# Patient Record
Sex: FEMALE | Race: WHITE
[De-identification: names, ages, dates, MRNs, and addresses within clinical notes are randomized per-mention and may not be internally consistent; named-entity substitution may affect disease eponyms.]

---

## 2017-10-30 NOTE — ED PHYSICIAN DOCUMENTATION
History of Present Illness





- Stated complaint


Stated Complaint: FAST HEART BEAT





- Chief complaint


Chief Complaint: Cardiac





- History obtained from


History obtained from: Patient, Family





- History of Present Illness


Timing: How many weeks ago (2)


Pain level max: 0


Pain level now: 0


Improved by: lying down


Worsened by: standing, walking





- Additonal information


Additional information: 





Patient is a 32-year-old female who presents to the emergency department with a 

complaint of her heart racing when she stands up or exerts herself over the 

past few weeks.  Has been seen by her primary care doctor for the same and had 

normal blood work done.  Including TSH and electrolytes.  She is feeling more 

tired than usual and was sent here for evaluation.  She believes that she may 

have been pregnant and may have had a miscarriage.  Is not currently vaginally 

bleeding.





Review of Systems


Ten Systems: 10 systems reviewed and negative


Constitutional: denies: Fever, Chills


Ears: denies: Ear pain


Nose: reports: Rhinorrhea / runny nose (now improved).  denies: Congestion


Throat: reports: Sore throat (mild, now better)


Cardiac: denies: Chest pain / pressure


Respiratory: denies: Cough, Wheezing


GI: denies: Abdominal Pain, Nausea, Vomiting, Diarrhea


: denies: Dysuria, Frequency, Hesitancy, Now pregnant EGA


Skin: denies: Rash


Musculoskeletal: denies: Neck pain, Back pain


Neurologic: reports: Headache (2/10 gradual onset)





PD PAST MEDICAL HISTORY





- Past Medical History


Past Medical History: No





- Past Surgical History


Past Surgical History: No





- Present Medications


Home Medications: 


 Ambulatory Orders











 Medication  Instructions  Recorded  Confirmed


 


No Known Home Medications [No  10/30/17 10/30/17





Known Home Medications]   














- Allergies


Allergies/Adverse Reactions: 


 Allergies











Allergy/AdvReac Type Severity Reaction Status Date / Time


 


No Known Drug Allergies Allergy   Verified 10/30/17 15:57














- Living Situation


Living Situation: reports: With family


Living Arrangement: reports: At home





- Social History


Does the pt smoke?: No


Does the pt drink ETOH?: No


Does the pt have substance abuse?: No





PD ED PE NORMAL





- Vitals


Vital signs reviewed: Yes





- General


General: Alert and oriented X 3, No acute distress, Well developed/nourished





- HEENT


HEENT: PERRL, Moist mucous membranes





- Neck


Neck: Supple, no meningeal sign





- Cardiac


Cardiac: RRR, Strong equal pulses





- Respiratory


Respiratory: No respiratory distress, Clear bilaterally





- Abdomen


Abdomen: Soft, Non tender, Non distended





- Derm


Derm: Warm and dry, No rash





- Extremities


Extremities: No edema, No calf tenderness / cord





- Neuro


Neuro: Alert and oriented X 3, CNs 2-12 intact, No motor deficit, No sensory 

deficit, Normal speech





- Psych


Psych: Normal mood, Normal affect





Results





- Vitals


Vitals: 


 Vital Signs - 24 hr











  10/30/17 10/30/17 10/30/17





  15:53 17:30 18:40


 


Temperature 36.8 C  


 


Heart Rate 107 H 69 78


 


Respiratory 16 16 16





Rate   


 


Blood Pressure 133/82 H 127/75 122/65


 


O2 Saturation 100 100 100














  10/30/17





  20:11


 


Temperature 


 


Heart Rate 77


 


Respiratory 16





Rate 


 


Blood Pressure 118/68


 


O2 Saturation 99








 Oxygen











O2 Source                      Room air

















- EKG (time done)


  ** 1603


Rate: Rate (enter#) (91)


Rhythm: NSR


Axis: Normal


Intervals: Normal LA


QRS: Normal


Ischemia: Normal ST segments





- Labs


Labs: 


 Laboratory Tests











  10/30/17 10/30/17 10/30/17





  17:51 17:51 17:51


 


WBC  7.9  


 


RBC  5.00  


 


Hgb  14.1  


 


Hct  42.3  


 


MCV  84.6  


 


MCH  28.2  


 


MCHC  33.3  


 


RDW  12.6  


 


Plt Count  273  


 


MPV  7.6 L  


 


Neut #  5.2  


 


Lymph #  2.0  


 


Mono #  0.6  


 


Eos #  0.1  


 


Baso #  0.1  


 


Absolute Nucleated RBC  0.00  


 


Nucleated RBC %  0.0  


 


Sodium   139 


 


Potassium   3.6 


 


Chloride   103 


 


Carbon Dioxide   23 


 


Anion Gap   13.0 


 


BUN   10 


 


Creatinine   0.5 


 


Estimated GFR (MDRD)   143 


 


Glucose   92 


 


Calcium   9.7 


 


Total Bilirubin   0.7 


 


AST   20 


 


ALT   16 


 


Alkaline Phosphatase   51 


 


Total Protein   7.9 


 


Albumin   4.8 


 


Globulin   3.1 


 


Albumin/Globulin Ratio   1.5 


 


Lipase   24 


 


TSH    0.98


 


Free T4    1.15


 


Urine Color   


 


Urine Clarity   


 


Urine pH   


 


Ur Specific Gravity   


 


Urine Protein   


 


Urine Glucose (UA)   


 


Urine Ketones   


 


Urine Occult Blood   


 


Urine Nitrite   


 


Urine Bilirubin   


 


Urine Urobilinogen   


 


Ur Leukocyte Esterase   


 


Urine RBC   


 


Urine WBC   


 


Ur Squamous Epith Cells   


 


Urine Bacteria   


 


Urine Mucus   


 


Ur Microscopic Review   


 


Urine Culture Comments   














  10/30/17





  18:42


 


WBC 


 


RBC 


 


Hgb 


 


Hct 


 


MCV 


 


MCH 


 


MCHC 


 


RDW 


 


Plt Count 


 


MPV 


 


Neut # 


 


Lymph # 


 


Mono # 


 


Eos # 


 


Baso # 


 


Absolute Nucleated RBC 


 


Nucleated RBC % 


 


Sodium 


 


Potassium 


 


Chloride 


 


Carbon Dioxide 


 


Anion Gap 


 


BUN 


 


Creatinine 


 


Estimated GFR (MDRD) 


 


Glucose 


 


Calcium 


 


Total Bilirubin 


 


AST 


 


ALT 


 


Alkaline Phosphatase 


 


Total Protein 


 


Albumin 


 


Globulin 


 


Albumin/Globulin Ratio 


 


Lipase 


 


TSH 


 


Free T4 


 


Urine Color  YELLOW


 


Urine Clarity  CLEAR


 


Urine pH  6.0


 


Ur Specific Gravity  1.015


 


Urine Protein  NEGATIVE


 


Urine Glucose (UA)  NEGATIVE


 


Urine Ketones  15 H


 


Urine Occult Blood  MODERATE H


 


Urine Nitrite  NEGATIVE


 


Urine Bilirubin  NEGATIVE


 


Urine Urobilinogen  0.2 (NORMAL)


 


Ur Leukocyte Esterase  NEGATIVE


 


Urine RBC  0-5


 


Urine WBC  0-3


 


Ur Squamous Epith Cells  NONE SEEN


 


Urine Bacteria  None Seen


 


Urine Mucus  Few Strands


 


Ur Microscopic Review  INDICATED


 


Urine Culture Comments  NOT INDICATED














PD MEDICAL DECISION MAKING





- ED course


Complexity details: reviewed results, re-evaluated patient, considered 

differential, d/w patient, d/w family


ED course: 





Patient is a 32-year-old female who presents to the emergency department with 

intermittent tachycardia, especially with standing.  Feeling more tired than 

usual over the past 2 weeks.  Her EBV testing from her primary care provider 

appears that this may be related to a reactivation of EBV.  Feels better after 

IV fluids, but still becomes lightheaded with standing.  Tolerating p.o. 

without difficulty.  Heart rate decreased after IV fluids and even with 

standing her heart rate only mildly increases now.  We will continue supportive 

care and follow-up with her doctor.  She is well-appearing, nontoxic.  

Afebrile.  No acute findings on EKG or telemetry.  Patient and family counseled 

regarding signs and symptoms for which I believe and urgent re-evaluation would 

be necessary. Patient with good understanding of and agreement to plan and is 

comfortable going home at this time





This document was made in part using voice recognition software. While efforts 

are made to proofread this document, sound alike and grammatical errors may 

occur.





Departure





- Departure


Disposition: 01 Home, Self Care


Clinical Impression: 


 Sinus tachycardia, Dehydration





Condition: Good


Instructions:  ED Dehydration, ED Palpitations


Follow-Up: 


Tani Langley in 1 week [Other] (Main fax: [phone])


Comments: 


Return if you worsen. The cause of your symptoms is unclear today. It may be 

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 Follow up with your 

doctor for further evaluation.


Discharge Date/Time: 10/30/17 20:24

## 2018-07-30 ENCOUNTER — HOSPITAL ENCOUNTER (EMERGENCY)
Dept: HOSPITAL 76 - ED | Age: 33
Discharge: HOME | End: 2018-07-30
Payer: COMMERCIAL

## 2018-07-30 VITALS — SYSTOLIC BLOOD PRESSURE: 97 MMHG | DIASTOLIC BLOOD PRESSURE: 58 MMHG
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
## 2018-07-30 DIAGNOSIS — S90.32XA: ICD-10-CM

## 2018-07-30 DIAGNOSIS — Z3A.24: ICD-10-CM
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
## 2018-07-30 DIAGNOSIS — O99.89: Primary | ICD-10-CM

## 2018-07-30 PROCEDURE — 73630 X-RAY EXAM OF FOOT: CPT

## 2018-07-30 PROCEDURE — 99283 EMERGENCY DEPT VISIT LOW MDM: CPT

## 2018-07-30 NOTE — ED PHYSICIAN DOCUMENTATION
PD HPI LOWER EXT INJURY





- Stated complaint


Stated Complaint: FOOT INJURY





- Chief complaint


Chief Complaint: Ext Problem





- History obtained from


History obtained from: Patient





- History of Present Illness


PD HPI LOW EXT INJURY LOCATION: Left, Foot


Type of injury: Blunt / blow


Where injury occurred: Home


Timing - onset: How many hours ago (2)


Timing - duration: Hours (2)


Timing - details: Abrupt onset


Pain level max: 8


Pain level now: 7


Improved by: Rest


Worsened by: Moving, Palpating


Associated symptoms: Swelling.  No: Weakness, Numbness, Tingling


Similar symptoms before: Has not had sx before


Recently seen: Not recently seen





- Additional information


Additional information: 





PVC pipe was dropped on her left foot.  She has approximately 24 weeks 

pregnant. Patient states she is unable to bear weight secondary to pain





Review of Systems


Constitutional: denies: Fever, Chills


: reports: Now pregnant EGA


Skin: denies: Rash


Musculoskeletal: denies: Neck pain, Back pain


Neurologic: denies: Focal weakness, Numbness





PD PAST MEDICAL HISTORY





- Past Medical History


Past Medical History: No





- Past Surgical History


Past Surgical History: No





- Present Medications


Home Medications: 


 Ambulatory Orders











 Medication  Instructions  Recorded  Confirmed


 


No Known Home Medications [No  10/30/17 10/30/17





Known Home Medications]   














- Allergies


Allergies/Adverse Reactions: 


 Allergies











Allergy/AdvReac Type Severity Reaction Status Date / Time


 


No Known Drug Allergies Allergy   Verified 07/30/18 22:16














- Social History


Does the pt smoke?: No


Smoking Status: Never smoker


Does the pt drink ETOH?: No


Does the pt have substance abuse?: No





- Immunizations


Immunizations are current?: Yes





- POLST


Patient has POLST: No





PD ED PE NORMAL





- Vitals


Vital signs reviewed: Yes





- General


General: Alert and oriented X 3, No acute distress





- HEENT


HEENT: Moist mucous membranes





- Derm


Derm: Warm and dry





- Extremities


Extremities: Other (Left foot - Mild swelling.  Mild ecchymosis to the dorsum 

of the foot.  Neurovascularly intact.  No deformity.  Otherwise normal exam)





- Neuro


Neuro: Alert and oriented X 3





Results





- Vitals


Vitals: 


 Vital Signs - 24 hr











  07/30/18 07/30/18 07/30/18





  22:15 22:48 23:51


 


Temperature 36.6 C  


 


Heart Rate 85  81


 


Respiratory 16 16 16





Rate   


 


Blood Pressure 118/75  97/58 L


 


O2 Saturation 99  98








 Oxygen











O2 Source                      Room air

















- Rads (name of study)


  ** Left foot x-ray


Radiology: Prelim report reviewed, EMP read contemporaneously, See rad report (

No acute osseous abnormality)





PD MEDICAL DECISION MAKING





- ED course


Complexity details: reviewed results, re-evaluated patient, considered 

differential, d/w patient


ED course: 





Patient is a 33-year-old female with a left foot contusion.  Placed in a 

postoperative shoe for comfort.  Given pain medication here.  We will have her 

follow-up with her doctor for further evaluation and care.  She brought her own 

crutches with her.  Patient and family counseled regarding signs and symptoms 

for which I believe and urgent re-evaluation would be necessary. Patient with 

good understanding of and agreement to plan and is comfortable going home at 

this time





This document was made in part using voice recognition software. While efforts 

are made to proofread this document, sound alike and grammatical errors may 

occur.





- Sepsis Event


Vital Signs: 


 Vital Signs - 24 hr











  07/30/18 07/30/18 07/30/18





  22:15 22:48 23:51


 


Temperature 36.6 C  


 


Heart Rate 85  81


 


Respiratory 16 16 16





Rate   


 


Blood Pressure 118/75  97/58 L


 


O2 Saturation 99  98








 Oxygen











O2 Source                      Room air

















Departure





- Departure


Disposition: 01 Home, Self Care


Clinical Impression: 


Contusion of foot, left


Qualifiers:


 Encounter type: initial encounter Qualified Code(s): S90.32XA - Contusion of 

left foot, initial encounter





Condition: Good


Instructions:  ED Contusion Foot


Follow-Up: 


Provider,Other [Primary Care Provider] - Within 1 week


Comments: 


Your x-rays are normal tonight.  Return if you worsen.  Wear the postoperative 

shoe and use the crutches as needed for comfort.


Discharge Date/Time: 07/30/18 23:52

## 2018-07-30 NOTE — XRAY REPORT
Procedure Date:  07/30/2018   

Accession Number:  [id number] / [id number]                    

Procedure:  XR  - Foot 3 View LT CPT Code:  

 

FULL RESULT:

 

 

EXAM:

LEFT FOOT RADIOGRAPHY

 

EXAM DATE: 7/30/2018 11:02 PM.

 

CLINICAL HISTORY: L foot pain s/p pipe dropped on foot.

 

COMPARISON: None.

 

TECHNIQUE: 3 views.

 

FINDINGS:

Bones: Normal. No fractures or bone lesions.

 

Joints: Normal. No subluxations.

 

Soft Tissues: Normal. No soft tissue swelling.

IMPRESSION: Normal foot radiography.

 

[facility]

## 2020-09-18 ENCOUNTER — HOSPITAL ENCOUNTER (OUTPATIENT)
Dept: HOSPITAL 76 - LAB.R | Age: 35
Discharge: HOME | End: 2020-09-18
Attending: ADVANCED PRACTICE MIDWIFE
Payer: COMMERCIAL

## 2020-09-18 DIAGNOSIS — N76.0: Primary | ICD-10-CM

## 2020-09-18 LAB
C KRUSEI DNA VAG QL NAA+PROBE: NEGATIVE
CANDIDA DNA VAG QL NAA+PROBE: NEGATIVE
T VAGINALIS RRNA GENITAL QL PROBE: NEGATIVE

## 2020-09-18 PROCEDURE — 87661 TRICHOMONAS VAGINALIS AMPLIF: CPT

## 2020-09-18 PROCEDURE — 87801 DETECT AGNT MULT DNA AMPLI: CPT

## 2020-11-23 ENCOUNTER — HOSPITAL ENCOUNTER (OUTPATIENT)
Dept: HOSPITAL 76 - COV | Age: 35
Discharge: HOME | End: 2020-11-23
Attending: FAMILY MEDICINE
Payer: COMMERCIAL

## 2020-11-23 DIAGNOSIS — R50.9: Primary | ICD-10-CM

## 2020-11-23 DIAGNOSIS — R53.83: ICD-10-CM

## 2020-11-23 DIAGNOSIS — R09.81: ICD-10-CM

## 2020-11-23 DIAGNOSIS — Z20.828: ICD-10-CM

## 2020-11-23 DIAGNOSIS — M79.10: ICD-10-CM
